# Patient Record
Sex: FEMALE | URBAN - METROPOLITAN AREA
[De-identification: names, ages, dates, MRNs, and addresses within clinical notes are randomized per-mention and may not be internally consistent; named-entity substitution may affect disease eponyms.]

---

## 2017-03-20 ENCOUNTER — IMPORTED ENCOUNTER (OUTPATIENT)
Dept: URBAN - METROPOLITAN AREA CLINIC 1 | Facility: CLINIC | Age: 52
End: 2017-03-20

## 2017-03-20 PROBLEM — Z01.00: Noted: 2017-03-20

## 2017-03-20 PROCEDURE — S0620 ROUTINE OPHTHALMOLOGICAL EXA: HCPCS

## 2017-03-20 NOTE — PATIENT DISCUSSION
1. Routine Exam- Patient has minimal refractive error Rx for glasses given. Return for an appointment in 1-2 yr [address] with Dr. Leonie Shin.

## 2022-01-13 NOTE — PATIENT DISCUSSION
Patient left trialing Ultra MF vs change in power in OD only in [employer] [facility].  RTC 2 week for CL check.

## 2022-04-02 ASSESSMENT — TONOMETRY
OS_IOP_MMHG: 20
OD_IOP_MMHG: 20

## 2022-04-02 ASSESSMENT — VISUAL ACUITY
OD_CC: 20/20
OS_CC: 20/20